# Patient Record
Sex: MALE | Race: WHITE
[De-identification: names, ages, dates, MRNs, and addresses within clinical notes are randomized per-mention and may not be internally consistent; named-entity substitution may affect disease eponyms.]

---

## 2021-02-09 ENCOUNTER — HOSPITAL ENCOUNTER (EMERGENCY)
Dept: HOSPITAL 96 - M.ERS | Age: 86
Discharge: HOME | End: 2021-02-09
Payer: COMMERCIAL

## 2021-02-09 VITALS — SYSTOLIC BLOOD PRESSURE: 163 MMHG | DIASTOLIC BLOOD PRESSURE: 91 MMHG

## 2021-02-09 VITALS — BODY MASS INDEX: 35.79 KG/M2 | WEIGHT: 250 LBS | HEIGHT: 70 IN

## 2021-02-09 DIAGNOSIS — K21.9: ICD-10-CM

## 2021-02-09 DIAGNOSIS — E78.5: ICD-10-CM

## 2021-02-09 DIAGNOSIS — F43.20: ICD-10-CM

## 2021-02-09 DIAGNOSIS — E86.0: ICD-10-CM

## 2021-02-09 DIAGNOSIS — S51.812A: Primary | ICD-10-CM
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
## 2021-02-09 DIAGNOSIS — E78.00: ICD-10-CM
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
## 2021-02-09 DIAGNOSIS — S39.012A: ICD-10-CM

## 2021-02-09 DIAGNOSIS — I10: ICD-10-CM
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
## 2021-02-09 DIAGNOSIS — R53.1: ICD-10-CM

## 2021-02-09 LAB
ABSOLUTE BASOPHILS: 0.1 THOU/UL (ref 0–0.2)
ABSOLUTE EOSINOPHILS: 0.1 THOU/UL (ref 0–0.7)
ABSOLUTE MONOCYTES: 0.5 THOU/UL (ref 0–1.2)
ALBUMIN SERPL-MCNC: 4.3 G/DL (ref 3.4–5)
ALP SERPL-CCNC: 97 U/L (ref 46–116)
ALT SERPL-CCNC: 59 U/L (ref 30–65)
AMYLASE SERPL-CCNC: 34 U/L (ref 25–115)
ANION GAP SERPL CALC-SCNC: 12 MMOL/L (ref 7–16)
AST SERPL-CCNC: 53 U/L (ref 15–37)
BASOPHILS NFR BLD AUTO: 0.6 %
BILIRUB DIRECT SERPL-MCNC: 0.3 MG/DL
BILIRUB SERPL-MCNC: 1 MG/DL
BILIRUB UR-MCNC: NEGATIVE MG/DL
BUN SERPL-MCNC: 20 MG/DL (ref 7–18)
CALCIUM SERPL-MCNC: 10 MG/DL (ref 8.5–10.1)
CHLORIDE SERPL-SCNC: 98 MMOL/L (ref 98–107)
CO2 SERPL-SCNC: 27 MMOL/L (ref 21–32)
COLOR UR: YELLOW
CREAT SERPL-MCNC: 1.2 MG/DL (ref 0.6–1.3)
EOSINOPHIL NFR BLD: 0.5 %
GLUCOSE SERPL-MCNC: 158 MG/DL (ref 70–99)
GRANULOCYTES NFR BLD MANUAL: 83.4 %
HCT VFR BLD CALC: 44.2 % (ref 42–52)
HGB BLD-MCNC: 15.5 GM/DL (ref 14–18)
KETONES UR STRIP-MCNC: (no result) MG/DL
LIPASE: 79 U/L (ref 73–393)
LYMPHOCYTES # BLD: 1.2 THOU/UL (ref 0.8–5.3)
LYMPHOCYTES NFR BLD AUTO: 11.1 %
MAGNESIUM SERPL-MCNC: 1.8 MG/DL (ref 1.8–2.4)
MCH RBC QN AUTO: 33.2 PG (ref 26–34)
MCHC RBC AUTO-ENTMCNC: 35.2 G/DL (ref 28–37)
MCV RBC: 94.4 FL (ref 80–100)
MONOCYTES NFR BLD: 4.4 %
MPV: 6.5 FL. (ref 7.2–11.1)
NEUTROPHILS # BLD: 9.1 THOU/UL (ref 1.6–8.1)
NITRITE UR QL STRIP: NEGATIVE
NT-PRO BRAIN NAT PEPTIDE: 56 PG/ML (ref ?–300)
NUCLEATED RBCS: 0 /100WBC
PLATELET COUNT*: 339 THOU/UL (ref 150–400)
POTASSIUM SERPL-SCNC: 3.7 MMOL/L (ref 3.5–5.1)
PROT SERPL-MCNC: 8 G/DL (ref 6.4–8.2)
PROT UR QL STRIP: (no result)
RBC # BLD AUTO: 4.68 MIL/UL (ref 4.5–6)
RBC # UR STRIP: (no result) /UL
RDW-CV: 13.5 % (ref 10.5–14.5)
SODIUM SERPL-SCNC: 137 MMOL/L (ref 136–145)
SP GR UR STRIP: 1.02 (ref 1–1.03)
URINE CLARITY: CLEAR
URINE GLUCOSE-RANDOM: NEGATIVE
URINE LEUKOCYTES: NEGATIVE
UROBILINOGEN UR STRIP-ACNC: 1 E.U./DL (ref 0.2–1)
WBC # BLD AUTO: 10.9 THOU/UL (ref 4–11)

## 2021-02-10 NOTE — EKG
[place] [address]
Phone:  [phone]                     ELECTROCARDIOGRAM REPORT      
_______________________________________________________________________________
 
Name:         ALEXANDER HYMAN              Room:                     [facility]#:    [id number]     Account #:     [id number]  
Admission:    21    Attend Phys:                     
Discharge:    21    Date of Birth: [birth year]  
Date of Service: 21 1249  Report #:      [id number]
        [id number]
_______________________________________________________________________________
THIS REPORT FOR:  //name//                      
 
                         [facility] ED
                                       
Test Date:    2021               Test Time:    12:49:42
Pat Name:     ALEXANDER HYMAN           Department:   
Patient ID:   [id number]            Room:          
Gender:                               Technician:   STUDENT
:          1935               Requested By: Brandon Ferris
Order Number: [id number]    Brea MD:   Alexander Jiménez
                                 Measurements
Intervals                              Axis          
Rate:         116                      P:            13
WA:           140                      QRS:          91
QRSD:         122                      T:            -1
QT:           341                                    
QTc:          474                                    
                           Interpretive Statements
Sinus tachycardia
Multiple premature complexes, vent & supraven
Nonspecific intraventricular conduction delay
Minimal ST depression, inferior leads
No previous ECG available for comparison
Electronically Signed On 2- 14:10:48 CST by Alexander Jiménez
[email or web address]
 
 
 
 
 
 
 
 
 
 
 
 
 
 
 
 
 
 
 
  <ELECTRONICALLY SIGNED>
                                           By: Alexander Jiménez MD, [facility]     
  02/10/21     1410
D: 21 1249   _____________________________________
T: 21 1249   Alexander Jiménez MD, [facility]       /EPI